# Patient Record
Sex: FEMALE | Race: WHITE | Employment: FULL TIME | URBAN - METROPOLITAN AREA
[De-identification: names, ages, dates, MRNs, and addresses within clinical notes are randomized per-mention and may not be internally consistent; named-entity substitution may affect disease eponyms.]

---

## 2024-04-22 ASSESSMENT — PATIENT HEALTH QUESTIONNAIRE - PHQ9
SUM OF ALL RESPONSES TO PHQ QUESTIONS 1-9: 0
2. FEELING DOWN, DEPRESSED OR HOPELESS: NOT AT ALL
1. LITTLE INTEREST OR PLEASURE IN DOING THINGS: NOT AT ALL
SUM OF ALL RESPONSES TO PHQ QUESTIONS 1-9: 0
SUM OF ALL RESPONSES TO PHQ9 QUESTIONS 1 & 2: 0
SUM OF ALL RESPONSES TO PHQ9 QUESTIONS 1 & 2: 0
1. LITTLE INTEREST OR PLEASURE IN DOING THINGS: NOT AT ALL
2. FEELING DOWN, DEPRESSED OR HOPELESS: NOT AT ALL
SUM OF ALL RESPONSES TO PHQ QUESTIONS 1-9: 0
SUM OF ALL RESPONSES TO PHQ QUESTIONS 1-9: 0

## 2024-04-23 ENCOUNTER — TELEPHONE (OUTPATIENT)
Dept: PHARMACY | Age: 49
End: 2024-04-23

## 2024-04-23 ENCOUNTER — OFFICE VISIT (OUTPATIENT)
Dept: FAMILY MEDICINE CLINIC | Age: 49
End: 2024-04-23
Payer: COMMERCIAL

## 2024-04-23 VITALS
BODY MASS INDEX: 39.28 KG/M2 | OXYGEN SATURATION: 98 % | HEIGHT: 70 IN | DIASTOLIC BLOOD PRESSURE: 120 MMHG | WEIGHT: 274.4 LBS | TEMPERATURE: 97.4 F | SYSTOLIC BLOOD PRESSURE: 240 MMHG | HEART RATE: 90 BPM

## 2024-04-23 DIAGNOSIS — E11.65 TYPE 2 DIABETES MELLITUS WITH HYPERGLYCEMIA, WITHOUT LONG-TERM CURRENT USE OF INSULIN (HCC): ICD-10-CM

## 2024-04-23 DIAGNOSIS — I10 ESSENTIAL HYPERTENSION: ICD-10-CM

## 2024-04-23 DIAGNOSIS — Z12.31 ENCOUNTER FOR SCREENING MAMMOGRAM FOR BREAST CANCER: ICD-10-CM

## 2024-04-23 DIAGNOSIS — E11.42 TYPE 2 DIABETES MELLITUS WITH DIABETIC POLYNEUROPATHY, WITHOUT LONG-TERM CURRENT USE OF INSULIN (HCC): ICD-10-CM

## 2024-04-23 DIAGNOSIS — Z23 ENCOUNTER FOR IMMUNIZATION: ICD-10-CM

## 2024-04-23 DIAGNOSIS — Z11.59 ENCOUNTER FOR SCREENING FOR OTHER VIRAL DISEASES: ICD-10-CM

## 2024-04-23 DIAGNOSIS — E55.9 VITAMIN D DEFICIENCY: ICD-10-CM

## 2024-04-23 DIAGNOSIS — R87.810 CERVICAL HIGH RISK HPV (HUMAN PAPILLOMAVIRUS) TEST POSITIVE: ICD-10-CM

## 2024-04-23 DIAGNOSIS — E78.5 HYPERLIPIDEMIA WITH TARGET LDL LESS THAN 100: ICD-10-CM

## 2024-04-23 DIAGNOSIS — R01.1 MURMUR, CARDIAC: ICD-10-CM

## 2024-04-23 DIAGNOSIS — Z00.01 ENCOUNTER FOR WELL ADULT EXAM WITH ABNORMAL FINDINGS: Primary | ICD-10-CM

## 2024-04-23 PROBLEM — L03.031 CELLULITIS OF THIRD TOE OF RIGHT FOOT: Status: RESOLVED | Noted: 2023-06-29 | Resolved: 2024-04-23

## 2024-04-23 PROBLEM — L03.115 CELLULITIS OF RIGHT FOOT: Status: RESOLVED | Noted: 2022-05-01 | Resolved: 2024-04-23

## 2024-04-23 PROBLEM — L08.9 TYPE 2 DIABETES MELLITUS WITH RIGHT DIABETIC FOOT INFECTION (HCC): Status: RESOLVED | Noted: 2022-04-30 | Resolved: 2024-04-23

## 2024-04-23 PROBLEM — Z28.21 REFUSED PNEUMOCOCCAL VACCINATION: Status: RESOLVED | Noted: 2019-01-04 | Resolved: 2024-04-23

## 2024-04-23 PROBLEM — M86.9 OSTEOMYELITIS (HCC): Status: RESOLVED | Noted: 2023-06-29 | Resolved: 2024-04-23

## 2024-04-23 PROBLEM — E11.628 TYPE 2 DIABETES MELLITUS WITH RIGHT DIABETIC FOOT INFECTION (HCC): Status: RESOLVED | Noted: 2022-04-30 | Resolved: 2024-04-23

## 2024-04-23 LAB — HBA1C MFR BLD: 11.3 %

## 2024-04-23 PROCEDURE — 90677 PCV20 VACCINE IM: CPT | Performed by: FAMILY MEDICINE

## 2024-04-23 PROCEDURE — 99214 OFFICE O/P EST MOD 30 MIN: CPT | Performed by: FAMILY MEDICINE

## 2024-04-23 PROCEDURE — 3080F DIAST BP >= 90 MM HG: CPT | Performed by: FAMILY MEDICINE

## 2024-04-23 PROCEDURE — 99396 PREV VISIT EST AGE 40-64: CPT | Performed by: FAMILY MEDICINE

## 2024-04-23 PROCEDURE — 83036 HEMOGLOBIN GLYCOSYLATED A1C: CPT | Performed by: FAMILY MEDICINE

## 2024-04-23 PROCEDURE — 90471 IMMUNIZATION ADMIN: CPT | Performed by: FAMILY MEDICINE

## 2024-04-23 PROCEDURE — 3077F SYST BP >= 140 MM HG: CPT | Performed by: FAMILY MEDICINE

## 2024-04-23 RX ORDER — LISINOPRIL 20 MG/1
20 TABLET ORAL DAILY
Qty: 90 TABLET | Refills: 3 | Status: SHIPPED | OUTPATIENT
Start: 2024-04-23

## 2024-04-23 RX ORDER — AMLODIPINE BESYLATE 10 MG/1
10 TABLET ORAL DAILY
Qty: 90 TABLET | Refills: 3 | Status: SHIPPED | OUTPATIENT
Start: 2024-04-23

## 2024-04-23 RX ORDER — ATORVASTATIN CALCIUM 40 MG/1
40 TABLET, FILM COATED ORAL DAILY
Qty: 90 TABLET | Refills: 3 | Status: SHIPPED | OUTPATIENT
Start: 2024-04-23

## 2024-04-23 RX ORDER — BLOOD-GLUCOSE METER
KIT MISCELLANEOUS
Qty: 1 KIT | Refills: 0 | Status: SHIPPED | OUTPATIENT
Start: 2024-04-23

## 2024-04-23 RX ORDER — INSULIN LISPRO 100 [IU]/ML
INJECTION, SOLUTION INTRAVENOUS; SUBCUTANEOUS
Qty: 12 ML | Refills: 0 | Status: SHIPPED | OUTPATIENT
Start: 2024-04-23

## 2024-04-23 RX ORDER — METFORMIN HYDROCHLORIDE 500 MG/1
1000 TABLET, EXTENDED RELEASE ORAL
Qty: 360 TABLET | Refills: 3 | Status: SHIPPED | OUTPATIENT
Start: 2024-04-23

## 2024-04-23 RX ORDER — GLUCOSAMINE HCL/CHONDROITIN SU 500-400 MG
CAPSULE ORAL
Qty: 300 STRIP | Refills: 3 | Status: SHIPPED | OUTPATIENT
Start: 2024-04-23

## 2024-04-23 SDOH — ECONOMIC STABILITY: INCOME INSECURITY: HOW HARD IS IT FOR YOU TO PAY FOR THE VERY BASICS LIKE FOOD, HOUSING, MEDICAL CARE, AND HEATING?: NOT HARD AT ALL

## 2024-04-23 SDOH — ECONOMIC STABILITY: FOOD INSECURITY: WITHIN THE PAST 12 MONTHS, THE FOOD YOU BOUGHT JUST DIDN'T LAST AND YOU DIDN'T HAVE MONEY TO GET MORE.: NEVER TRUE

## 2024-04-23 SDOH — ECONOMIC STABILITY: FOOD INSECURITY: WITHIN THE PAST 12 MONTHS, YOU WORRIED THAT YOUR FOOD WOULD RUN OUT BEFORE YOU GOT MONEY TO BUY MORE.: NEVER TRUE

## 2024-04-23 SDOH — ECONOMIC STABILITY: HOUSING INSECURITY
IN THE LAST 12 MONTHS, WAS THERE A TIME WHEN YOU DID NOT HAVE A STEADY PLACE TO SLEEP OR SLEPT IN A SHELTER (INCLUDING NOW)?: NO

## 2024-04-23 ASSESSMENT — ENCOUNTER SYMPTOMS
COUGH: 0
CHEST TIGHTNESS: 0
WHEEZING: 0
NAUSEA: 0
DIARRHEA: 0
ABDOMINAL DISTENTION: 0
VOMITING: 0
ABDOMINAL PAIN: 0
CONSTIPATION: 0
SHORTNESS OF BREATH: 0
BACK PAIN: 0

## 2024-04-23 ASSESSMENT — VISUAL ACUITY
OD_CC: 20/20
OS_CC: 20/13

## 2024-04-23 NOTE — PROGRESS NOTES
Visit Information    Have you changed or started any medications since your last visit including any over-the-counter medicines, vitamins, or herbal medicines? no   Have you stopped taking any of your medications? Is so, why? -  no  Are you having any side effects from any of your medications? - no    Have you seen any other physician or provider since your last visit?  yes -    Have you had any other diagnostic tests since your last visit?  yes -    Have you been seen in the emergency room and/or had an admission in a hospital since we last saw you?  yes -    Have you had your routine dental cleaning in the past 6 months?  yes -      Do you have an active MyChart account? If no, what is the barrier?  Yes    Patient Care Team:  Anna Starkey MD as PCP - General (Family Medicine)  Anna Starkey MD as PCP - Empaneled Provider  Kodi Bishop DPM as Consulting Physician (Podiatry)  Zay Mathias DPM as Consulting Physician (Podiatry)  Shruthi Solis DPM as Surgeon (Podiatry)    Medical History Review  Past Medical, Family, and Social History reviewed and does contribute to the patient presenting condition    Health Maintenance   Topic Date Due    Pneumococcal 0-64 years Vaccine (1 of 2 - PCV) Never done    Hepatitis C screen  Never done    Diabetic Alb to Cr ratio (uACR) test  06/08/2019    Hepatitis B vaccine (2 of 3 - 19+ 3-dose series) 04/08/2020    Lipids  02/26/2021    Diabetic retinal exam  07/28/2022    COVID-19 Vaccine (4 - 2023-24 season) 09/01/2023    A1C test (Diabetic or Prediabetic)  09/29/2023    Cervical cancer screen  02/15/2024    Diabetic foot exam  06/29/2024    GFR test (Diabetes, CKD 3-4, OR last GFR 15-59)  07/01/2024    Flu vaccine (Season Ended) 08/01/2024    Depression Screen  04/22/2025    DTaP/Tdap/Td vaccine (2 - Td or Tdap) 06/08/2028    Colorectal Cancer Screen  06/21/2032    HIV screen  Completed    Hepatitis A vaccine  Aged Out    Hib vaccine  Aged Out    
fluconazole (DIFLUCAN) 150 MG tablet Therapy completed    amLODIPine (NORVASC) 5 MG tablet REORDER    atorvastatin (LIPITOR) 20 MG tablet REORDER    lisinopril (PRINIVIL;ZESTRIL) 20 MG tablet REORDER    metFORMIN (GLUCOPHAGE-XR) 500 MG extended release tablet REORDER    SITagliptin (JANUVIA) 100 MG tablet REORDER    Lancets 30G MISC REORDER    glucose monitoring (FREESTYLE FREEDOM) kit REORDER    blood glucose monitor strips REORDER    Alcohol Swabs PADS Therapy completed    Blood Pressure KIT Therapy completed         Orders Placed This Encounter   Procedures    SAMMI QUENTIN DIGITAL SCREEN BILATERAL     Standing Status:   Future     Standing Expiration Date:   6/23/2025    Pneumococcal, PCV20, PREVNAR 20, (age 6w+), IM, PF    Hepatitis C Antibody     Standing Status:   Future     Standing Expiration Date:   4/23/2025    Lipid Panel     Standing Status:   Future     Standing Expiration Date:   4/23/2025     Order Specific Question:   Is Patient Fasting?/# of Hours     Answer:   8-10 Hours, water ok to drink    CBC with Auto Differential     Standing Status:   Future     Standing Expiration Date:   4/23/2025    Comprehensive Metabolic Panel     Standing Status:   Future     Standing Expiration Date:   4/23/2025    Vitamin D 25 Hydroxy     Standing Status:   Future     Standing Expiration Date:   4/23/2025    Vitamin B12 & Folate     Standing Status:   Future     Standing Expiration Date:   4/23/2025    TSH     Standing Status:   Future     Standing Expiration Date:   4/23/2025    Magnesium     Standing Status:   Future     Standing Expiration Date:   4/23/2025    Microalbumin / Creatinine Urine Ratio     Standing Status:   Future     Standing Expiration Date:   4/23/2025    Uric Acid     Standing Status:   Future     Standing Expiration Date:   4/23/2025    Antony Sales, , Gynecology, [place]     Referral Priority:   Routine     Referral Type:   Eval and Treat     Referral Reason:   Specialty Services Required

## 2024-04-23 NOTE — RESULT ENCOUNTER NOTE
Addressed during office visit today, hemoglobin A1c 11.3, worse from prior, continue treatment recommended during the office visit.

## 2024-04-23 NOTE — TELEPHONE ENCOUNTER
Received new referral for Diabetes Management from Dr. Starkey. Called patient and left a voicemail to call us back as soon as possible to schedule an initial appointment.     Adelia Alexander, PharmD  PGY1 Pharmacy Resident   [facility]

## 2024-05-08 ENCOUNTER — TELEPHONE (OUTPATIENT)
Dept: FAMILY MEDICINE CLINIC | Age: 49
End: 2024-05-08

## 2024-05-09 NOTE — TELEPHONE ENCOUNTER
Patient was prescribed glucometer on 4/23/2024  Continuity of Care Document  05/08/2024  SIMEON TREVIZO - 48 y.o. Female; born [birth year][employer]: Health and Safety Notification, generated on May 08, 2024May 08, 2024   Health and Safety Notification    At the request of your patients' plan sponsors, [employer] manages your patients' pharmacy benefit and has enclosed prescription claims records for the patients listed.Please review the health information provided and make any changes in therapy that you believe are appropriate. [employer] understands that the information may not be applicable to every patient's therapy and therefore presents it as informational only.    Health Consideration: Insulin and Continuous Glucose Monitor Your patient is receiving insulin therapy and may be at increased risk for hypoglycemia. Hypoglycemia may be prevented with more frequent blood glucose monitoring. Our records indicate that your patient is not utilizing a continuous glucose monitor, which provides continuous glucose data in real time and may be useful in reducing hypoglycemia.  Patient Demographics

## 2024-05-11 ENCOUNTER — HOSPITAL ENCOUNTER (OUTPATIENT)
Dept: WOMENS IMAGING | Age: 49
End: 2024-05-11
Payer: COMMERCIAL

## 2024-05-11 DIAGNOSIS — Z12.31 ENCOUNTER FOR SCREENING MAMMOGRAM FOR BREAST CANCER: ICD-10-CM

## 2024-05-11 PROCEDURE — 77063 BREAST TOMOSYNTHESIS BI: CPT

## 2024-05-15 ENCOUNTER — OFFICE VISIT (OUTPATIENT)
Dept: OBGYN CLINIC | Age: 49
End: 2024-05-15
Payer: COMMERCIAL

## 2024-05-15 ENCOUNTER — HOSPITAL ENCOUNTER (OUTPATIENT)
Age: 49
Setting detail: SPECIMEN
Discharge: HOME | End: 2024-05-15

## 2024-05-15 VITALS
WEIGHT: 276 LBS | HEIGHT: 70 IN | SYSTOLIC BLOOD PRESSURE: 130 MMHG | DIASTOLIC BLOOD PRESSURE: 82 MMHG | BODY MASS INDEX: 39.51 KG/M2 | HEART RATE: 86 BPM

## 2024-05-15 DIAGNOSIS — R87.810 HUMAN PAPILLOMAVIRUS (HPV) TYPE 16 DNA DETECTED IN CERVICAL SPECIMEN: ICD-10-CM

## 2024-05-15 DIAGNOSIS — Z01.419 WELL WOMAN EXAM: Primary | ICD-10-CM

## 2024-05-15 DIAGNOSIS — Z11.51 SCREENING FOR HPV (HUMAN PAPILLOMAVIRUS): ICD-10-CM

## 2024-05-15 DIAGNOSIS — Z01.419 PAP SMEAR, AS PART OF ROUTINE GYNECOLOGICAL EXAMINATION: ICD-10-CM

## 2024-05-15 PROCEDURE — 3075F SYST BP GE 130 - 139MM HG: CPT | Performed by: STUDENT IN AN ORGANIZED HEALTH CARE EDUCATION/TRAINING PROGRAM

## 2024-05-15 PROCEDURE — 3079F DIAST BP 80-89 MM HG: CPT | Performed by: STUDENT IN AN ORGANIZED HEALTH CARE EDUCATION/TRAINING PROGRAM

## 2024-05-15 PROCEDURE — 99386 PREV VISIT NEW AGE 40-64: CPT | Performed by: STUDENT IN AN ORGANIZED HEALTH CARE EDUCATION/TRAINING PROGRAM

## 2024-05-15 NOTE — PROGRESS NOTES
OB/GYN New Patient Annual Visit  [facility]     Tracie Hernandez  5/15/2024                       Primary Care Physician: Anna Starkey MD    CC:   Chief Complaint   Patient presents with    Annual Exam         HPI: Tracie Hernandez is a 48 y.o. female  here for a new patient annual visit. She has a history of abnormal pap smear and was referred for cervical cancer screening.     Patient's last menstrual period was 2024 (approximate). She reports regular menstrual cycles. They last 7 days at a time and she knows when they will start. She has a few days of heavy bleeding and then they improve. Discussed in depth expectations for menopause and definition in case periods change.     Her bowel habits are regular. She denies any bloating.  She denies dysuria. She denies urinary leaking.  She denies vaginal discharge.  She is sexually active with a male partner. She denies any postcoital bleeding or pain with intercourse. She denies any breast complaints.    Depression Screen: Symptoms of decreased mood absent  Symptoms of anhedonia absent    REVIEW OF SYSTEMS:   Constitutional: negative fever, negative chills, negative weight changes   HEENT: negative visual disturbances, negative headaches, negative dizziness, negative hearing loss  Breast: Negative breast abnormalities, negative breast lumps, negative nipple discharge  Respiratory: negative dyspnea, negative cough, negative SOB  Cardiovascular: negative chest pain,  negative palpitations, negative arrhythmia, negative syncope   Gastrointestinal: negative abdominal pain, negative RUQ pain, negative N/V, negative diarrhea, negative constipation, negative bowel changes, negative heartburn   Genitourinary: negative pelvic pain, negative dysuria, negative hematuria, negative urinary incontinence, negative vaginal discharge, negative vaginal bleeding  Dermatological: negative rash, negative pruritis, negative mole or other skin

## 2024-05-17 LAB
HPV I/H RISK 4 DNA CVX QL NAA+PROBE: NOT DETECTED
HPV SAMPLE: NORMAL
HPV, INTERPRETATION: NORMAL
HPV16 DNA CVX QL NAA+PROBE: NOT DETECTED
HPV18 DNA CVX QL NAA+PROBE: NOT DETECTED
SPECIMEN DESCRIPTION: NORMAL

## 2024-05-31 LAB — CYTOLOGY REPORT: NORMAL

## 2024-07-26 ENCOUNTER — TELEPHONE (OUTPATIENT)
Dept: FAMILY MEDICINE CLINIC | Age: 49
End: 2024-07-26

## 2024-09-07 ENCOUNTER — HOSPITAL ENCOUNTER (EMERGENCY)
Age: 49
Discharge: HOME | End: 2024-09-07
Attending: EMERGENCY MEDICINE
Payer: COMMERCIAL

## 2024-09-07 ENCOUNTER — APPOINTMENT (OUTPATIENT)
Dept: GENERAL RADIOLOGY | Age: 49
End: 2024-09-07
Payer: COMMERCIAL

## 2024-09-07 VITALS
OXYGEN SATURATION: 100 % | DIASTOLIC BLOOD PRESSURE: 85 MMHG | WEIGHT: 251.32 LBS | SYSTOLIC BLOOD PRESSURE: 158 MMHG | RESPIRATION RATE: 20 BRPM | HEART RATE: 77 BPM | BODY MASS INDEX: 35.19 KG/M2 | TEMPERATURE: 98.7 F | HEIGHT: 71 IN

## 2024-09-07 DIAGNOSIS — S46.811A STRAIN OF RIGHT TRAPEZIUS MUSCLE, INITIAL ENCOUNTER: Primary | ICD-10-CM

## 2024-09-07 PROCEDURE — 71045 X-RAY EXAM CHEST 1 VIEW: CPT

## 2024-09-07 PROCEDURE — 6360000002 HC RX W HCPCS: Performed by: EMERGENCY MEDICINE

## 2024-09-07 PROCEDURE — 6370000000 HC RX 637 (ALT 250 FOR IP): Performed by: EMERGENCY MEDICINE

## 2024-09-07 PROCEDURE — 99284 EMERGENCY DEPT VISIT MOD MDM: CPT

## 2024-09-07 PROCEDURE — 96372 THER/PROPH/DIAG INJ SC/IM: CPT

## 2024-09-07 RX ORDER — KETOROLAC TROMETHAMINE 30 MG/ML
30 INJECTION, SOLUTION INTRAMUSCULAR; INTRAVENOUS ONCE
Status: COMPLETED | OUTPATIENT
Start: 2024-09-07 | End: 2024-09-07

## 2024-09-07 RX ORDER — HYDROCODONE BITARTRATE AND ACETAMINOPHEN 5; 325 MG/1; MG/1
1 TABLET ORAL EVERY 4 HOURS PRN
Qty: 18 TABLET | Refills: 0 | Status: SHIPPED | OUTPATIENT
Start: 2024-09-07 | End: 2024-09-10

## 2024-09-07 RX ORDER — LIDOCAINE 50 MG/G
1 PATCH TOPICAL DAILY
Qty: 30 PATCH | Refills: 0 | Status: SHIPPED | OUTPATIENT
Start: 2024-09-07 | End: 2024-10-07

## 2024-09-07 RX ORDER — ORPHENADRINE CITRATE 30 MG/ML
60 INJECTION INTRAMUSCULAR; INTRAVENOUS ONCE
Status: COMPLETED | OUTPATIENT
Start: 2024-09-07 | End: 2024-09-07

## 2024-09-07 RX ORDER — CYCLOBENZAPRINE HCL 10 MG
10 TABLET ORAL 3 TIMES DAILY PRN
Qty: 30 TABLET | Refills: 0 | Status: SHIPPED | OUTPATIENT
Start: 2024-09-07

## 2024-09-07 RX ORDER — LIDOCAINE 4 G/G
1 PATCH TOPICAL DAILY
Status: DISCONTINUED | OUTPATIENT
Start: 2024-09-07 | End: 2024-09-07 | Stop reason: HOSPADM

## 2024-09-07 RX ADMIN — KETOROLAC TROMETHAMINE 30 MG: 30 INJECTION, SOLUTION INTRAMUSCULAR at 13:56

## 2024-09-07 RX ADMIN — ORPHENADRINE CITRATE 60 MG: 30 INJECTION, SOLUTION INTRAMUSCULAR; INTRAVENOUS at 13:56

## 2024-09-07 ASSESSMENT — PAIN DESCRIPTION - LOCATION: LOCATION: SHOULDER

## 2024-09-07 ASSESSMENT — PAIN - FUNCTIONAL ASSESSMENT: PAIN_FUNCTIONAL_ASSESSMENT: 0-10

## 2024-09-07 ASSESSMENT — PAIN SCALES - GENERAL: PAINLEVEL_OUTOF10: 10

## 2024-09-07 ASSESSMENT — PAIN DESCRIPTION - ORIENTATION: ORIENTATION: RIGHT

## 2024-09-07 ASSESSMENT — PAIN DESCRIPTION - PAIN TYPE: TYPE: ACUTE PAIN

## 2024-09-07 ASSESSMENT — PAIN DESCRIPTION - DESCRIPTORS: DESCRIPTORS: ACHING

## 2024-09-07 NOTE — ED PROVIDER NOTES
[facility]  [address]  [place] [address]  Phone: [phone]  Fax: [phone]  EMERGENCY DEPARTMENT ENCOUNTER      Pt Name: Tracie Hernandez  MRN: [id number]  Birthdate 1975  Date of evaluation: 9/7/2024    CHIEF COMPLAINT       Chief Complaint   Patient presents with    Shoulder Pain     Posterior right shoulder pain, trapezius pains and neck area pains.  Onset this morning.  No relief taking Aleve.        HISTORY OF PRESENT ILLNESS    Tracie Hernandez is a 49 y.o. female who presents to the Emergency Department complaining of right trapezius pain that started just after waking up.  She noticed the discomfort and it was mild at the time however later in the day she was getting out of a car and the pain intensified.  Focal sharp pain over her right upper trapezius.  No pain down the arm.  No numbness or weakness.  Denies any neck pain.  Pain 10 out of 10.  No other relevant symptoms.    REVIEW OF SYSTEMS       Constitutional: No fevers or chills   HENT: No sore throat, rhinorrhea, or earache   Eyes: No blurry vision or double vision no drainage   Cardiovascular: No chest pain or tachycardia   Respiratory: No wheezing or shortness of breath no cough   Gastrointestinal: No nausea, vomiting, diarrhea, constipation, or abdominal pain   : No hematuria or dysuria   Musculoskeletal: Right upper trapezius pain no extremity swelling or pain  Skin: No rash   Neurological: No focal neurologic complaints, paresthesias, weakness, or headache     PAST MEDICAL HISTORY    has a past medical history of Acute blood loss as cause of postoperative anemia, Cellulitis of right foot, Cellulitis of third toe of right foot, Essential hypertension, Hyperlipidemia with target LDL less than 100, Obesity (BMI 30-39.9), Osteomyelitis (HCC), Other fracture of right great toe, initial encounter for closed fracture, Type 2 diabetes mellitus with Charcot's joint of left foot (HCC), Type 2  not available at the time of my evaluation.  Some of this information could likely include laboratory values, vital sign updates, medications etc.)    Fredis Lara DO   Attending Emergency Physician

## 2024-09-07 NOTE — DISCHARGE INSTRUCTIONS
Take medications as prescribed  Heat as needed  No driving if taking muscle relaxer or pain medicine    Return immediately if any worsening symptoms or any other concerns    Please understand that early in the process of an illness or injury, an emergency department workup can be falsely reassuring.      Tell us how we did visit: [email or web address]   and let us know about your experience

## 2024-09-11 ENCOUNTER — TELEPHONE (OUTPATIENT)
Dept: FAMILY MEDICINE CLINIC | Age: 49
End: 2024-09-11

## 2024-10-14 ENCOUNTER — TELEPHONE (OUTPATIENT)
Dept: FAMILY MEDICINE CLINIC | Age: 49
End: 2024-10-14

## 2024-10-15 NOTE — TELEPHONE ENCOUNTER
Please schedule the patient for appointment for diabetes    Future Appointments   Date Time Provider Department Center   5/19/2025  8:00 AM Antony Schwartz DO [facility]     Lab Results   Component Value Date    LABA1C 11.3 04/23/2024    LABA1C 10.6 (H) 06/29/2023    LABA1C 12.4 (H) 05/01/2022         FYI    Drug Safety Consideration: JANUVIA and ARTHRALGIA Our claims record suggests that your patient is receiving JANUVIA and may have developed ARTHRALGIA. Severe and disabling joint pain has been reported with the use of DPP-4 inhibitors, in some cases requiring hospitalization. Symptoms typically resolved upon drug discontinuation; some patients had recurrence of severe joint pain when restarting treatment with the same drug or another member of the class. Discontinuation of DPP-4 inhibitors should be considered in those with severe and persistent joint pain. Please consider the potential risks versus benefits of therapy for your patient.

## 2024-10-30 ENCOUNTER — TELEPHONE (OUTPATIENT)
Dept: FAMILY MEDICINE CLINIC | Age: 49
End: 2024-10-30

## 2024-10-30 NOTE — TELEPHONE ENCOUNTER
10/30/2024  Patient is MY-CHART STATUS ACTIVE.    A My-Chart message has been sent out to patient. Per Provider request to deliver message to patient in regard to  message .    Recent Visits  Date Type Provider Dept   04/23/24 Office Visit Anna Starkey MD [facility]   Showing recent visits within past 540 days with a meds authorizing provider and meeting all other requirements  Future Appointments  No visits were found meeting these conditions.  Showing future appointments within next 150 days with a meds authorizing provider and meeting all other requirements

## 2024-10-30 NOTE — TELEPHONE ENCOUNTER
I received message from her insurance that she is overdue for labs/lipids.  She is actually due for multiple labs for diabetes, needs appointment& TO overdue labs from 4/23/2024DO, in the computer, fasting    Please schedule her an appointment for diabetes in 3 to 4 weeks as well    Future Appointments   Date Time Provider Department Center   5/19/2025  8:00 AM Antony Schwartz, DO [facility]       FYI  [employer] works with your patients' plan sponsors to provide you with the enclosed [employer] safety and health considerations for patients in your practice. Please review the health information provided and make any changes in therapy that you believe are appropriate. [employer] understands that the information may not be applicable to every patient's therapy and therefore presents it as informational only.    Testing Consideration: Cholesterol Levels with Statin Therapy Available records suggest that your patient is taking ATORVASTATIN CALCIUM and may not be receiving periodic lipid profile assessment. AHA/ACC guidelines advise obtaining a lipid panel 4-12 weeks after initiating or adjusting statin therapy and every 3 to 12 months thereafter to assess patient adherence and response to treatment. The available records may not reflect all tests performed in various healthcare settings, as well as very recent testing. If routine tests are not already being performed, please consider lipid profile assessment to monitor therapy in your patient.  Patient Demographics

## 2025-02-02 DIAGNOSIS — E78.5 HYPERLIPIDEMIA WITH TARGET LDL LESS THAN 100: ICD-10-CM

## 2025-02-02 DIAGNOSIS — I10 ESSENTIAL HYPERTENSION: ICD-10-CM

## 2025-02-03 RX ORDER — AMLODIPINE BESYLATE 10 MG/1
TABLET ORAL
Qty: 90 TABLET | Refills: 3 | OUTPATIENT
Start: 2025-02-03

## 2025-02-03 RX ORDER — ATORVASTATIN CALCIUM 40 MG/1
40 TABLET, FILM COATED ORAL DAILY
Qty: 90 TABLET | Refills: 3 | OUTPATIENT
Start: 2025-02-03

## 2025-02-03 RX ORDER — LISINOPRIL 20 MG/1
20 TABLET ORAL DAILY
Qty: 90 TABLET | Refills: 3 | OUTPATIENT
Start: 2025-02-03

## 2025-02-03 NOTE — TELEPHONE ENCOUNTER
Please Approve or Refuse.  Send to Pharmacy per Pt's Request: [facility]     Next Visit Date:  Visit date not found   Last Visit Date: 4/23/2024    Hemoglobin A1C (%)   Date Value   04/23/2024 11.3   06/29/2023 10.6 (H)   05/01/2022 12.4 (H)             ( goal A1C is < 7)   BP Readings from Last 3 Encounters:   09/07/24 (!) 158/85   05/15/24 130/82   04/23/24 (!) 240/120          (goal 120/80)  BUN   Date Value Ref Range Status   07/01/2023 14 6 - 20 mg/dL Final     Creatinine   Date Value Ref Range Status   07/01/2023 0.63 0.50 - 0.90 mg/dL Final     Potassium   Date Value Ref Range Status   07/01/2023 3.9 3.7 - 5.3 mmol/L Final

## 2025-02-03 NOTE — TELEPHONE ENCOUNTER
Overdue labs, failed refill check, due to no labs, no kidney function normal lipids no visit in the past 9 months  Please call her and explaine to her the labs are still valid in the system she needs to do them ASAP then I will refill her medication ASAP, and also make an appointment in 3 to 4 weeks

## 2025-04-30 DIAGNOSIS — E11.65 TYPE 2 DIABETES MELLITUS WITH HYPERGLYCEMIA, WITHOUT LONG-TERM CURRENT USE OF INSULIN (HCC): ICD-10-CM

## 2025-04-30 RX ORDER — METFORMIN HYDROCHLORIDE 500 MG/1
1000 TABLET, EXTENDED RELEASE ORAL
Qty: 180 TABLET | Refills: 7 | Status: SHIPPED | OUTPATIENT
Start: 2025-04-30

## 2025-04-30 NOTE — TELEPHONE ENCOUNTER
Please Approve or Refuse.  Send to Pharmacy per Pt's Request:      Next Visit Date:  Visit date not found   Last Visit Date: 4/23/2024    Hemoglobin A1C (%)   Date Value   04/23/2024 11.3   06/29/2023 10.6 (H)   05/01/2022 12.4 (H)             ( goal A1C is < 7)   BP Readings from Last 3 Encounters:   09/07/24 (!) 158/85   05/15/24 130/82   04/23/24 (!) 240/120          (goal 120/80)  BUN   Date Value Ref Range Status   07/01/2023 14 6 - 20 mg/dL Final     Creatinine   Date Value Ref Range Status   07/01/2023 0.63 0.50 - 0.90 mg/dL Final     Potassium   Date Value Ref Range Status   07/01/2023 3.9 3.7 - 5.3 mmol/L Final

## 2025-05-19 ENCOUNTER — HOSPITAL ENCOUNTER (OUTPATIENT)
Age: 50
Setting detail: SPECIMEN
Discharge: HOME | End: 2025-05-19

## 2025-05-19 ENCOUNTER — OFFICE VISIT (OUTPATIENT)
Dept: OBGYN CLINIC | Age: 50
End: 2025-05-19
Payer: COMMERCIAL

## 2025-05-19 VITALS
SYSTOLIC BLOOD PRESSURE: 120 MMHG | HEART RATE: 99 BPM | BODY MASS INDEX: 37.94 KG/M2 | HEIGHT: 70 IN | DIASTOLIC BLOOD PRESSURE: 84 MMHG | WEIGHT: 265 LBS

## 2025-05-19 DIAGNOSIS — Z20.2 STD EXPOSURE: ICD-10-CM

## 2025-05-19 DIAGNOSIS — N89.8 VAGINAL DISCHARGE: ICD-10-CM

## 2025-05-19 DIAGNOSIS — Z01.419 WELL WOMAN EXAM: Primary | ICD-10-CM

## 2025-05-19 DIAGNOSIS — Z12.4 PAP SMEAR FOR CERVICAL CANCER SCREENING: ICD-10-CM

## 2025-05-19 DIAGNOSIS — Z12.11 SCREEN FOR COLON CANCER: ICD-10-CM

## 2025-05-19 DIAGNOSIS — Z11.51 SCREENING FOR HPV (HUMAN PAPILLOMAVIRUS): ICD-10-CM

## 2025-05-19 DIAGNOSIS — Z12.31 SCREENING MAMMOGRAM FOR BREAST CANCER: ICD-10-CM

## 2025-05-19 PROCEDURE — 3074F SYST BP LT 130 MM HG: CPT | Performed by: STUDENT IN AN ORGANIZED HEALTH CARE EDUCATION/TRAINING PROGRAM

## 2025-05-19 PROCEDURE — 3079F DIAST BP 80-89 MM HG: CPT | Performed by: STUDENT IN AN ORGANIZED HEALTH CARE EDUCATION/TRAINING PROGRAM

## 2025-05-19 PROCEDURE — 99396 PREV VISIT EST AGE 40-64: CPT | Performed by: STUDENT IN AN ORGANIZED HEALTH CARE EDUCATION/TRAINING PROGRAM

## 2025-05-19 SDOH — ECONOMIC STABILITY: FOOD INSECURITY: WITHIN THE PAST 12 MONTHS, YOU WORRIED THAT YOUR FOOD WOULD RUN OUT BEFORE YOU GOT MONEY TO BUY MORE.: NEVER TRUE

## 2025-05-19 SDOH — ECONOMIC STABILITY: FOOD INSECURITY: WITHIN THE PAST 12 MONTHS, THE FOOD YOU BOUGHT JUST DIDN'T LAST AND YOU DIDN'T HAVE MONEY TO GET MORE.: NEVER TRUE

## 2025-05-19 ASSESSMENT — PATIENT HEALTH QUESTIONNAIRE - PHQ9
SUM OF ALL RESPONSES TO PHQ QUESTIONS 1-9: 0
1. LITTLE INTEREST OR PLEASURE IN DOING THINGS: NOT AT ALL
SUM OF ALL RESPONSES TO PHQ QUESTIONS 1-9: 0
2. FEELING DOWN, DEPRESSED OR HOPELESS: NOT AT ALL

## 2025-05-19 NOTE — PROGRESS NOTES
OB/GYN Annual Visit  [facility]     Tracie Hernandez  2025                       Primary Care Physician: Anna Starkey MD    CC:   Chief Complaint   Patient presents with    Annual Exam         HPI: Tracie Hernandez is a 49 y.o. female  here for an annual visit. She has no complaints today.     Patient's last menstrual period was 2025. She reports overall regular menstrual cycles. She continues to report they are about 7 days with moderate bleeding and cramping. She denies any menopausal symptoms.    Her bowel habits are regular. She denies any bloating.  She denies dysuria. She denies urinary leaking.  She complains of vaginal discharge.  She is sexually active with male partner.  She is not desiring pregnancy. She denies any postcoital bleeding or pain with intercourse. She denies any breast complaints.    Depression Screen: Symptoms of decreased mood absent  Symptoms of anhedonia absent    REVIEW OF SYSTEMS:   Constitutional: negative fever, negative chills, negative weight changes   HEENT: negative visual disturbances, negative headaches, negative dizziness, negative hearing loss  Breast: Negative breast abnormalities, negative breast lumps, negative nipple discharge  Respiratory: negative dyspnea, negative cough, negative SOB  Cardiovascular: negative chest pain,  negative palpitations, negative arrhythmia, negative syncope   Gastrointestinal: negative abdominal pain, negative RUQ pain, negative N/V, negative diarrhea, negative constipation, negative bowel changes, negative heartburn   Genitourinary: negative pelvic pain, negative dysuria, negative hematuria, negative urinary incontinence, positive vaginal discharge, negative irregular vaginal bleeding  Dermatological: negative rash, negative pruritis, negative mole or other skin changes  Hematologic: negative bruising  Immunologic/Lymphatic: negative recent illness, negative recent sick contact  Musculoskeletal: negative back

## 2025-05-20 ENCOUNTER — RESULTS FOLLOW-UP (OUTPATIENT)
Age: 50
End: 2025-05-20

## 2025-05-20 DIAGNOSIS — N89.8 VAGINAL DISCHARGE: ICD-10-CM

## 2025-05-20 DIAGNOSIS — Z20.2 STD EXPOSURE: ICD-10-CM

## 2025-05-20 LAB
C TRACH DNA SPEC QL PROBE+SIG AMP: NEGATIVE
CANDIDA SPECIES: NEGATIVE
GARDNERELLA VAGINALIS: POSITIVE
N GONORRHOEA DNA SPEC QL PROBE+SIG AMP: NEGATIVE
SOURCE: ABNORMAL
SPECIMEN DESCRIPTION: NORMAL
TRICHOMONAS: NEGATIVE

## 2025-05-20 RX ORDER — METRONIDAZOLE 500 MG/1
500 TABLET ORAL 2 TIMES DAILY
Qty: 14 TABLET | Refills: 0 | Status: SHIPPED | OUTPATIENT
Start: 2025-05-20 | End: 2025-05-20 | Stop reason: ALTCHOICE

## 2025-05-20 RX ORDER — CLINDAMYCIN HYDROCHLORIDE 300 MG/1
300 CAPSULE ORAL 2 TIMES DAILY
Qty: 14 CAPSULE | Refills: 0 | Status: SHIPPED | OUTPATIENT
Start: 2025-05-20 | End: 2025-05-27

## 2025-05-29 ENCOUNTER — RESULTS FOLLOW-UP (OUTPATIENT)
Dept: FAMILY MEDICINE CLINIC | Age: 50
End: 2025-05-29

## 2025-05-29 ENCOUNTER — OFFICE VISIT (OUTPATIENT)
Dept: FAMILY MEDICINE CLINIC | Age: 50
End: 2025-05-29
Payer: COMMERCIAL

## 2025-05-29 VITALS
SYSTOLIC BLOOD PRESSURE: 148 MMHG | TEMPERATURE: 98.6 F | OXYGEN SATURATION: 99 % | DIASTOLIC BLOOD PRESSURE: 86 MMHG | WEIGHT: 268.7 LBS | HEART RATE: 89 BPM | BODY MASS INDEX: 38.47 KG/M2 | HEIGHT: 70 IN

## 2025-05-29 DIAGNOSIS — R01.1 HEART MURMUR: ICD-10-CM

## 2025-05-29 DIAGNOSIS — Z11.59 ENCOUNTER FOR SCREENING FOR OTHER VIRAL DISEASES: ICD-10-CM

## 2025-05-29 DIAGNOSIS — Z79.4 TYPE 2 DIABETES MELLITUS WITH HYPERGLYCEMIA, WITH LONG-TERM CURRENT USE OF INSULIN (HCC): Primary | ICD-10-CM

## 2025-05-29 DIAGNOSIS — E11.42 TYPE 2 DIABETES MELLITUS WITH DIABETIC POLYNEUROPATHY, WITHOUT LONG-TERM CURRENT USE OF INSULIN (HCC): ICD-10-CM

## 2025-05-29 DIAGNOSIS — E55.9 VITAMIN D DEFICIENCY: ICD-10-CM

## 2025-05-29 DIAGNOSIS — E11.65 TYPE 2 DIABETES MELLITUS WITH HYPERGLYCEMIA, WITH LONG-TERM CURRENT USE OF INSULIN (HCC): Primary | ICD-10-CM

## 2025-05-29 DIAGNOSIS — Z23 ENCOUNTER FOR IMMUNIZATION: ICD-10-CM

## 2025-05-29 DIAGNOSIS — D64.9 ANEMIA, UNSPECIFIED TYPE: ICD-10-CM

## 2025-05-29 DIAGNOSIS — E78.5 HYPERLIPIDEMIA WITH TARGET LDL LESS THAN 100: ICD-10-CM

## 2025-05-29 DIAGNOSIS — I10 ESSENTIAL HYPERTENSION: ICD-10-CM

## 2025-05-29 LAB — HBA1C MFR BLD: 13.2 %

## 2025-05-29 PROCEDURE — 83036 HEMOGLOBIN GLYCOSYLATED A1C: CPT | Performed by: FAMILY MEDICINE

## 2025-05-29 PROCEDURE — 90471 IMMUNIZATION ADMIN: CPT | Performed by: FAMILY MEDICINE

## 2025-05-29 PROCEDURE — 3077F SYST BP >= 140 MM HG: CPT | Performed by: FAMILY MEDICINE

## 2025-05-29 PROCEDURE — 3046F HEMOGLOBIN A1C LEVEL >9.0%: CPT | Performed by: FAMILY MEDICINE

## 2025-05-29 PROCEDURE — 90746 HEPB VACCINE 3 DOSE ADULT IM: CPT | Performed by: FAMILY MEDICINE

## 2025-05-29 PROCEDURE — 99214 OFFICE O/P EST MOD 30 MIN: CPT | Performed by: FAMILY MEDICINE

## 2025-05-29 PROCEDURE — 3079F DIAST BP 80-89 MM HG: CPT | Performed by: FAMILY MEDICINE

## 2025-05-29 RX ORDER — INSULIN GLARGINE 300 U/ML
10 INJECTION, SOLUTION SUBCUTANEOUS NIGHTLY
Qty: 15 ML | Refills: 0 | Status: SHIPPED | OUTPATIENT
Start: 2025-05-29

## 2025-05-29 RX ORDER — INSULIN LISPRO 100 [IU]/ML
INJECTION, SOLUTION INTRAVENOUS; SUBCUTANEOUS
Qty: 12 ML | Refills: 5 | Status: SHIPPED | OUTPATIENT
Start: 2025-05-29

## 2025-05-29 RX ORDER — GLUCOSAMINE HCL/CHONDROITIN SU 500-400 MG
CAPSULE ORAL
Qty: 300 STRIP | Refills: 3 | Status: SHIPPED | OUTPATIENT
Start: 2025-05-29

## 2025-05-29 RX ORDER — ALCOHOL ANTISEPTIC PADS
PADS, MEDICATED (EA) TOPICAL
Qty: 300 EACH | Refills: 3 | Status: SHIPPED | OUTPATIENT
Start: 2025-05-29

## 2025-05-29 RX ORDER — ATORVASTATIN CALCIUM 40 MG/1
40 TABLET, FILM COATED ORAL DAILY
Qty: 90 TABLET | Refills: 3 | Status: SHIPPED | OUTPATIENT
Start: 2025-05-29

## 2025-05-29 RX ORDER — LISINOPRIL 20 MG/1
20 TABLET ORAL DAILY
Qty: 90 TABLET | Refills: 3 | Status: SHIPPED | OUTPATIENT
Start: 2025-05-29

## 2025-05-29 RX ORDER — AMLODIPINE BESYLATE 10 MG/1
10 TABLET ORAL DAILY
Qty: 90 TABLET | Refills: 3 | Status: SHIPPED | OUTPATIENT
Start: 2025-05-29

## 2025-05-29 SDOH — ECONOMIC STABILITY: FOOD INSECURITY: WITHIN THE PAST 12 MONTHS, YOU WORRIED THAT YOUR FOOD WOULD RUN OUT BEFORE YOU GOT MONEY TO BUY MORE.: NEVER TRUE

## 2025-05-29 SDOH — ECONOMIC STABILITY: FOOD INSECURITY: WITHIN THE PAST 12 MONTHS, THE FOOD YOU BOUGHT JUST DIDN'T LAST AND YOU DIDN'T HAVE MONEY TO GET MORE.: NEVER TRUE

## 2025-05-29 ASSESSMENT — PATIENT HEALTH QUESTIONNAIRE - PHQ9
SUM OF ALL RESPONSES TO PHQ QUESTIONS 1-9: 0
SUM OF ALL RESPONSES TO PHQ QUESTIONS 1-9: 0
2. FEELING DOWN, DEPRESSED OR HOPELESS: NOT AT ALL
1. LITTLE INTEREST OR PLEASURE IN DOING THINGS: NOT AT ALL
SUM OF ALL RESPONSES TO PHQ QUESTIONS 1-9: 0
SUM OF ALL RESPONSES TO PHQ QUESTIONS 1-9: 0

## 2025-05-29 NOTE — ASSESSMENT & PLAN NOTE
Unsure if improving or not.  Will recheck level  she completed a high-dose vitamin D round already      Orders:    Vitamin D 25 Hydroxy; Future

## 2025-05-29 NOTE — PROGRESS NOTES
Tracie Hernandez (:  1975) is a 49 y.o. female, Established patient, here for evaluation of the following chief complaint(s):   Diabetes, Hypertension, and Hyperlipidemia           Assessment & Plan        Assessment & Plan  Type 2 diabetes mellitus with hyperglycemia, with long-term current use of insulin (HCC)  Chronic and worsening type 2 diabetes mellitus, uncontrolled    - Her A1c level is significantly elevated at 13.2, indicating poor glycemic control and potential insulin resistance.  - She will continue her current regimen of metformin  mg twice daily. A prescription for Toujeo, starting with 10 units at night, has been provided.  - She is advised to monitor her blood glucose levels up to three times daily and report the readings after one week.     A prescription for Zepbound has also been issued, to be administered weekly. She is instructed to discontinue Januvia and commence the use of pen needles for Toujeo.   Counseling given regarding use of insulin, also advised to start using her insulin sliding scale as prescribed    - Refills for test strips and lancets have been provided. Fasting labs including CBC, CMP, lipids, magnesium, thyroid, uric acid, vitamin B12, vitamin D, Hep C screening, and microalbumin have been ordered. An EKG and echo have also been ordered due to murmur and high risk patient.    Chronic type 2 diabetes mellitus, worsening and poorly controlled if she experiences abdominal pain or constipation with Zepbound, she should discontinue its use.  She declines continuous glucose monitoring, she preferred to use her glucometer instead    Lab Results   Component Value Date    LABA1C 13.2 2025    LABA1C 11.3 2024    LABA1C 10.6 (H) 2023         Lab Results   Component Value Date    LABA1C 11.3 2024    LABA1C 10.6 (H) 2023    LABA1C 12.4 (H) 2022       Orders:    POCT glycosylated hemoglobin (Hb A1C)    HM DIABETES FOOT EXAM    blood

## 2025-05-29 NOTE — PROGRESS NOTES
Visit Information    Have you changed or started any medications since your last visit including any over-the-counter medicines, vitamins, or herbal medicines? no   Have you stopped taking any of your medications? Is so, why? -  no  Are you having any side effects from any of your medications? - no    Have you seen any other physician or provider since your last visit?  yes -    Have you had any other diagnostic tests since your last visit?  NO   Have you been seen in the emergency room and/or had an admission in a hospital since we last saw you?  YES  Have you had your routine dental cleaning in the past 6 months?  yes -      Do you have an active MyChart account? If no, what is the barrier?  Yes      “Have you had a diabetic eye exam?”    NO     Date of last diabetic eye exam: 7/28/2021   Patient Care Team:  Anna Starkey MD as PCP - General (Family Medicine)  Anna Starkey MD as PCP - Empaneled Provider  Shruthi Solis DPM as Surgeon (Podiatry)  Sarath Simons DPM as Consulting Physician (Podiatry)    Medical History Review  Past Medical, Family, and Social History reviewed and does contribute to the patient presenting condition    Health Maintenance   Topic Date Due    Hepatitis C screen  Never done    Diabetic Alb to Cr ratio (uACR) test  06/08/2019    Hepatitis B vaccine (2 of 3 - 19+ 3-dose series) 04/08/2020    Lipids  02/26/2021    Diabetic retinal exam  07/28/2022    Shingles vaccine (1 of 2) Never done    GFR test (Diabetes, CKD 3-4, OR last GFR 15-59)  07/01/2024    COVID-19 Vaccine (4 - 2024-25 season) 09/01/2024    Diabetic foot exam  04/23/2025    A1C test (Diabetic or Prediabetic)  04/23/2025    Breast cancer screen  05/11/2025    Cervical cancer screen  05/21/2025    Colorectal Cancer Screen  06/21/2025    Flu vaccine (Season Ended) 08/01/2025    Depression Screen  05/19/2026    DTaP/Tdap/Td vaccine (2 - Td or Tdap) 06/08/2028    Pneumococcal 50+ years Vaccine  Completed    HIV

## 2025-05-29 NOTE — ASSESSMENT & PLAN NOTE
Chronic and inadequately controlled  Ran out of both Lisinopril and amlodipine   Restart blood pressure medications, recheck labs  - Her blood pressure is elevated today.  - A refill for lisinopril 20 mg and Norvasc 10 Mg have been provided.  - She is advised to schedule a nurse visit in one week for a blood pressure recheck.  - Blood pressure parameters have been discussed, and she is instructed to monitor her blood pressure regularly.  Orders:    amLODIPine (NORVASC) 10 MG tablet; Take 1 tablet by mouth daily Goal BP bellow 130/80 and above 115/65, heart rate 56 to 90 bpm.    lisinopril (PRINIVIL;ZESTRIL) 20 MG tablet; Take 1 tablet by mouth daily Goal BP bellow 130/80 and above 115/65, heart rate 56 to 90 bpm    CBC with Auto Differential; Future    Comprehensive Metabolic Panel; Future    Magnesium; Future    TSH; Future    Uric Acid; Future     Negative Screen

## 2025-05-29 NOTE — ASSESSMENT & PLAN NOTE
Chronic, worsening type 2 diabetes mellitus, poorly controlled  Chronic polyneuropathy, moderately severe with Charcot foot, likely worsening as well  Advised to make appointment with podiatry  To work on diabetes control, medications adjusted as below  Stop Markouvia, start Zepbound, Toujeo with plan to adjust the dosage, continue metformin  Orders:    POCT glycosylated hemoglobin (Hb A1C)     DIABETES FOOT EXAM    blood glucose monitor strips; Testing 3 times a day.  Please dispense according to patients insurance.    insulin lispro, 1 Unit Dial, (HUMALOG KWIKPEN) 100 UNIT/ML SOPN; If <139             No Insulin; 140-199 2 Units;200-249 4 Units; 250-299 6 Units;  300-349 8 Units; 350-400 10 Units Above 400       12 Units    Insulin Glargine, 2 Unit Dial, (TOUJEO MAX SOLOSTAR) 300 UNIT/ML concentrated injection pen; Inject 10 Units into the skin at bedtime    Lancets 30G MISC; Testing once a day.  Please dispense according to patients insurance.    Insulin Pen Needle 31G X 5 MM MISC; 1 each by Does not apply route daily With insulin    tirzepatide-weight management (ZEPBOUND) 2.5 MG/0.5ML SOAJ subCUTAneous auto-injector pen; Inject 2.5 mg into the skin every 7 days Stop Januvia    CBC with Auto Differential; Future    Comprehensive Metabolic Panel; Future    Vitamin B12 & Folate; Future    Albumin/Creatinine Ratio, Urine; Future

## 2025-05-29 NOTE — RESULT ENCOUNTER NOTE
Addressed during office visit today, POC A1c 13.2 worsening, continue treatment recommended during the office visit.   Please check with patient if she received Zepbound from the pharmacy or if she needs prior authorization

## 2025-05-29 NOTE — ASSESSMENT & PLAN NOTE
Chronic and improved with medication, continue Lipitor 40 Mg daily, check lipid panel    Orders:    atorvastatin (LIPITOR) 40 MG tablet; Take 1 tablet by mouth daily Dose increased 4/23/2024    Lipid Panel; Future

## 2025-06-02 PROBLEM — R87.810 HUMAN PAPILLOMAVIRUS (HPV) TYPE 16 DNA DETECTED IN CERVICAL SPECIMEN: Status: RESOLVED | Noted: 2024-04-23 | Resolved: 2025-06-02

## 2025-06-02 PROBLEM — E11.65 TYPE 2 DIABETES MELLITUS WITH HYPERGLYCEMIA, WITHOUT LONG-TERM CURRENT USE OF INSULIN (HCC): Status: ACTIVE | Noted: 2025-06-02

## 2025-06-02 PROBLEM — E66.9 OBESITY (BMI 30-39.9): Status: RESOLVED | Noted: 2018-06-08 | Resolved: 2025-06-02

## 2025-06-02 PROBLEM — Z79.4 TYPE 2 DIABETES MELLITUS WITH HYPERGLYCEMIA, WITH LONG-TERM CURRENT USE OF INSULIN (HCC): Status: ACTIVE | Noted: 2025-06-02

## 2025-06-02 PROBLEM — D64.9 ANEMIA: Status: ACTIVE | Noted: 2025-06-02

## 2025-06-02 PROBLEM — Z28.21 REFUSED INFLUENZA VACCINE: Status: RESOLVED | Noted: 2019-01-04 | Resolved: 2025-06-02

## 2025-06-02 LAB — CYTOLOGY REPORT: NORMAL

## 2025-06-02 NOTE — ASSESSMENT & PLAN NOTE
Chronic, mild, recheck CBC  To get up-to-date with colon cancer screening  If persistent anemia then will need full anemia workup   If iron deficiency anemia, she will need full GI workup, including EGD and colonoscopy  Orders:    CBC with Auto Differential; Future

## 2025-06-02 NOTE — ASSESSMENT & PLAN NOTE
Chronic, never completed EKG and echocardiogram ordered in 2024 or 2018    Orders:    EKG 12 Lead; Future    Echo (TTE) complete (PRN contrast/bubble/strain/3D); Future

## 2025-06-02 NOTE — ASSESSMENT & PLAN NOTE
Chronic and worsening type 2 diabetes mellitus, uncontrolled    - Her A1c level is significantly elevated at 13.2, indicating poor glycemic control and potential insulin resistance.  - She will continue her current regimen of metformin  mg twice daily. A prescription for Toujeo, starting with 10 units at night, has been provided.  - She is advised to monitor her blood glucose levels up to three times daily and report the readings after one week.     A prescription for Zepbound has also been issued, to be administered weekly. She is instructed to discontinue Januvia and commence the use of pen needles for Toujeo.   Counseling given regarding use of insulin, also advised to start using her insulin sliding scale as prescribed    - Refills for test strips and lancets have been provided. Fasting labs including CBC, CMP, lipids, magnesium, thyroid, uric acid, vitamin B12, vitamin D, Hep C screening, and microalbumin have been ordered. An EKG and echo have also been ordered due to murmur and high risk patient.    Chronic type 2 diabetes mellitus, worsening and poorly controlled if she experiences abdominal pain or constipation with Zepbound, she should discontinue its use.  She declines continuous glucose monitoring, she preferred to use her glucometer instead    Lab Results   Component Value Date    LABA1C 13.2 05/29/2025    LABA1C 11.3 04/23/2024    LABA1C 10.6 (H) 06/29/2023         Lab Results   Component Value Date    LABA1C 11.3 04/23/2024    LABA1C 10.6 (H) 06/29/2023    LABA1C 12.4 (H) 05/01/2022       Orders:    POCT glycosylated hemoglobin (Hb A1C)     DIABETES FOOT EXAM    blood glucose monitor strips; Testing 3 times a day.  Please dispense according to patients insurance.    insulin lispro, 1 Unit Dial, (HUMALOG KWIKPEN) 100 UNIT/ML SOPN; If <139             No Insulin; 140-199 2 Units;200-249 4 Units; 250-299 6 Units;  300-349 8 Units; 350-400 10 Units Above 400       12 Units    Insulin Glargine, 2

## 2025-08-08 ENCOUNTER — TELEPHONE (OUTPATIENT)
Dept: FAMILY MEDICINE CLINIC | Age: 50
End: 2025-08-08